# Patient Record
(demographics unavailable — no encounter records)

---

## 2025-03-18 NOTE — HISTORY OF PRESENT ILLNESS
[Result of repetitive motion] : result of repetitive motion [6] : 6 [1] : 2 [Dull/Aching] : dull/aching [Occasional] : occasional [Nothing helps with pain getting better] : Nothing helps with pain getting better [Retired] : Work status: retired [de-identified] : 3/18/25: 68 YF with b/l knee pain for some time now.  No recent injury.  Pain worse going up and down stairs.  No locking or instability.  She does report h/o left medial meniscus tear 2 years ago, which responded to PT.  She as a [place] trip coming up and would like to try some more PT. [] : Post Surgical Visit: no [FreeTextEntry5] : 03/18/2025 This is a  68 year female present for bl Knees pain, no injury , pain using stars for 3-4 months

## 2025-03-18 NOTE — PHYSICAL EXAM
[5___] : hamstring 5[unfilled]/5 [Bilateral] : knee bilaterally [All Views] : anteroposterior, lateral, skyline, and anteroposterior standing [Degenerative change] : Degenerative change [] : non-antalgic [FreeTextEntry9] : R>L

## 2025-03-18 NOTE — ASSESSMENT
[FreeTextEntry1] : 03/18/2025: BT knees x-rays, 4 views, reveals degenerative changes R>L.  Underlying pathology reviewed and treatment options discussed. Start PT and HEP to improve mechanics and reduce pain. Activity modification as tolerated. Prescribed Meloxicam 15mg.  I discussed the proper use of this medication as well as potential side effects. Questions addressed. Follow up in 4-6 weeks.   The documentation recorded by the scribe accurately reflects the service I personally performed and the decisions made by me. I, Fanta Viera, attest that this documentation has been prepared under the direction and in the presence of Provider Fox James MD.   The patient was seen by Fox James MD.